# Patient Record
Sex: MALE | Race: WHITE
[De-identification: names, ages, dates, MRNs, and addresses within clinical notes are randomized per-mention and may not be internally consistent; named-entity substitution may affect disease eponyms.]

---

## 2018-12-04 ENCOUNTER — HOSPITAL ENCOUNTER (EMERGENCY)
Dept: HOSPITAL 97 - ER | Age: 83
Discharge: HOME | End: 2018-12-04
Payer: COMMERCIAL

## 2018-12-04 VITALS — TEMPERATURE: 98.9 F

## 2018-12-04 VITALS — DIASTOLIC BLOOD PRESSURE: 84 MMHG | OXYGEN SATURATION: 100 % | SYSTOLIC BLOOD PRESSURE: 139 MMHG

## 2018-12-04 DIAGNOSIS — Z23: ICD-10-CM

## 2018-12-04 DIAGNOSIS — Z79.899: ICD-10-CM
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
## 2018-12-04 DIAGNOSIS — Z79.82: ICD-10-CM

## 2018-12-04 DIAGNOSIS — S60.511A: ICD-10-CM

## 2018-12-04 DIAGNOSIS — I48.91: ICD-10-CM

## 2018-12-04 DIAGNOSIS — S60.512A: ICD-10-CM

## 2018-12-04 DIAGNOSIS — I10: ICD-10-CM

## 2018-12-04 DIAGNOSIS — S01.511A: Primary | ICD-10-CM

## 2018-12-04 DIAGNOSIS — S02.5XXA: ICD-10-CM

## 2018-12-04 PROCEDURE — 99284 EMERGENCY DEPT VISIT MOD MDM: CPT

## 2018-12-04 PROCEDURE — 0JQ10ZZ REPAIR FACE SUBCUTANEOUS TISSUE AND FASCIA, OPEN APPROACH: ICD-10-PCS

## 2018-12-04 PROCEDURE — 90714 TD VACC NO PRESV 7 YRS+ IM: CPT

## 2018-12-04 NOTE — ER
Nurse's Notes                                                                                     

 [facility]                                                                

Name: Tom Gamze Jr                                                                           

Age: 86 yrs                                                                                       

Sex: Male                                                                                         

: 1932                                                                                   

MRN: [id number]                                                                                   

Arrival Date: 2018                                                                          

Time: 13:56                                                                                       

Account#: [id number]                                                                            

Bed 8                                                                                             

Private MD: Denzel Quinones R                                                                       

Diagnosis: Fall due to bumping against object;Laceration without foreign body of other part of    

  head-lip , through and through;Fracture of tooth (traumatic)                                    

                                                                                                  

Presentation:                                                                                     

                                                                                             

14:01 Presenting complaint: Patient states: got his foot caught on a step and fell face       sv  

      forward, abrasions to face, bilateral hands, right knee pain, and knocked a front tooth     

      out. Care prior to arrival: None. Mechanism of Injury: Fall from standing position.         

      Trauma event details: Injury occurred in the [facility], Injury occurred: at        

      home. Injury occurred: 2018.                                                   

14:01 Acuity: HUMPHREY 3                                                                           sv  

14:01 Method Of Arrival: Ambulatory                                                           sv  

14:29 Transition of care: patient was not received from another setting of care. Onset of     tw2 

      symptoms was 2018. Risk Assessment: Do you want to hurt yourself or            

      someone else? Patient reports no desire to harm self or others. Initial Sepsis Screen:      

      Does the patient meet any 2 criteria? No. Patient's initial sepsis screen is negative.      

      Does the patient have a suspected source of infection? No. Patient's initial sepsis         

      screen is negative.                                                                         

                                                                                                  

Trauma Activation: Not Applicable                                                                 

 Physician: ED Physician; Name: ; Notified At: ; Arrived At:                                      

 Physician: General Surgeon; Name: ; Notified At: ; Arrived At:                                   

 Physician: Radiology; Name: ; Notified At: ; Arrived At:                                         

 Physician: Respiratory; Name: ; Notified At: ; Arrived At:                                       

 Physician: Lab; Name: ; Notified At: ; Arrived At:                                               

                                                                                                  

Historical:                                                                                       

- Allergies:                                                                                      

14:03 No Known Allergies;                                                                     sv  

- Home Meds:                                                                                      

14:32 aspirin 81 mg Oral TbEC 1 tab once daily [Active]; Centrum Silver Oral daily [Active];  tw2 

      lisinopril-hydrochlorothiazide 10-12.5 mg Oral tab 1 tab once daily [Active];               

- PMHx:                                                                                           

14:03 Atrial Fib; HARD OF HEARING; Hypertension;                                              sv  

- PSHx:                                                                                           

14:03 Appendectomy; Hernia repair;                                                            sv  

                                                                                                  

- Immunization history:: Adult Immunizations up to date.                                          

- Social history:: Smoking status: Patient/guardian denies using tobacco.                         

- Immunization history: Last tetanus immunization: unknown.                                       

- Ebola Screening: : No symptoms or risks identified at this time.                                

- Family history:: not pertinent.                                                                 

                                                                                                  

                                                                                                  

Screenin:08 Abuse screen: Denies threats or abuse. Nutritional screening: No deficits noted.        tw2 

      Tuberculosis screening: No symptoms or risk factors identified. Fall Risk None              

      identified.                                                                                 

                                                                                                  

Primary Survey:                                                                                   

14:08 A: Airway: patent. Breathing/Chest: Respiratory pattern: regular, Respiratory effort:   tw2 

      spontaneous, unlabored, Breath sounds: clear, bilaterally. Chest inspection:                

      symmetrical rise and fall of the chest. Circulation: Heart tones present. Disability        

      Alert.                                                                                      

15:15 Reassessment Airway Airway Patent Breathing/Chest Respiratory pattern Regular           tw2 

      Respiratory effort Spontaneous Unlabored Breath sounds Clear Chest inspection               

      Symmetrical Circulation Heart tones Present Disability Alert.                               

                                                                                                  

Secondary Survey:                                                                                 

14:15 HEENT: Face Other abrasions noted to nose, lip, and chin, with laceration to the upper  tw2 

      lip. Gastrointestinal: Abdomen is soft. : No signs and/or symptoms were reported          

      regarding the genitourinary system. Musculoskeletal: Range of motion: intact in all         

      extremities. pt has blood blister on the left ring finger, multiple abrasions noted to      

      right and left hand.                                                                        

                                                                                                  

Assessment:                                                                                       

14:15 General: Appears in no apparent distress. Behavior is calm, cooperative, appropriate    tw2 

      for age. Pain: Complains of pain in philtrum and upper vermilion border. Neuro: Level       

      of Consciousness is awake, alert, obeys commands, Oriented to person, place, time,          

      situation. Cardiovascular: Heart tones S1 S2 Patient's skin is warm and dry.                

      Respiratory: Airway is patent Respiratory effort is even, unlabored, Respiratory            

      pattern is regular, symmetrical, Breath sounds are clear bilaterally. GI: No signs          

      and/or symptoms were reported involving the gastrointestinal system. Abdomen is flat,       

      Bowel sounds present X 4 quads. : No signs and/or symptoms were reported regarding        

      the genitourinary system. EENT: No signs and/or symptoms were reported regarding the        

      EENT system. Derm: Skin is pink, warm \T\ dry. Skin temperature is warm. Musculoskeletal:   

      Range of motion: intact in all extremities. Injury Description: Laceration sustained to     

      philtrum is jagged, 0.5 to 2.5 cm long, not bleeding, front right tooth was knocked out     

      in the fall, tooth has been placed in milk at this time, pts spouse has removed dental      

      bridge at this time.                                                                        

15:24 Reassessment: Patient appears in no apparent distress at this time. No changes from     tw2 

      previously documented assessment. Patient and/or family updated on plan of care and         

      expected duration. Pain level reassessed. Patient is alert, oriented x 3, equal             

      unlabored respirations, skin warm/dry/pink.                                                 

16:25 Reassessment: Patient appears in no apparent distress at this time. No changes from     tw2 

      previously documented assessment. Patient and/or family updated on plan of care and         

      expected duration. Pain level reassessed. Patient is alert, oriented x 3, equal             

      unlabored respirations, skin warm/dry/pink. Dr. Teixeira at bedside suturing at this        

      time.                                                                                       

16:49 Reassessment: Patient appears in no apparent distress at this time. No changes from     tw2 

      previously documented assessment. Patient and/or family updated on plan of care and         

      expected duration. Pain level reassessed. Patient is alert, oriented x 3, equal             

      unlabored respirations, skin warm/dry/pink.                                                 

                                                                                                  

Vital Signs:                                                                                      

14:03  / 69; Pulse 89; Resp 18; Temp 98.9; Weight 96.16 kg; Height 6 ft. 3 in. (190.50  sv  

      cm); Pain 0/10;                                                                             

14:12 Pulse Ox 99% on R/A;                                                                    tw2 

15:24  / 85; Pulse 81; Resp 17; Pulse Ox 99% on R/A;                                    tw2 

16:28  / 84; Pulse 86; Resp 17; Pulse Ox 100% on R/A;                                   tw2 

14:03 Body Mass Index 26.50 (96.16 kg, 190.50 cm)                                             sv  

                                                                                                  

Waldron Coma Score:                                                                               

14:09 Eye Response: spontaneous(4). Verbal Response: oriented(5). Motor Response: obeys       tw2 

      commands(6). Total: 15.                                                                     

                                                                                                  

Trauma Score (Adult):                                                                             

14:09 Eye Response: spontaneous(1); Verbal Response: oriented(1); Motor Response: obeys       tw2 

      commands(2); Systolic BP: > 89 mm Hg(4); Respiratory Rate: 10 to 29 per min(4); Waldron     

      Score: 15; Trauma Score: 12                                                                 

                                                                                                  

ED Course:                                                                                        

11:15 Thermoregulation: warm blanket given to patient.                                        tw2 

13:56 Patient arrived in ED.                                                                  dl4 

13:57 Denzel Quinones MD is Private Physician.                                                  dl4 

14:03 Triage completed.                                                                       sv  

14:04 Arm band placed on.                                                                     sv  

14:05 Patient maintains SpO2 saturation greater than 95% on room air.                         tw2 

14:08 Martha Jones RN is Primary Nurse.                                                        tw2 

14:13 Phuc Teixeira MD is Attending Physician.                                             yareli 

14:15 Placed in gown. Bed in low position. Adult w/ patient. Pulse ox on. NIBP on. Warm       tw2 

      blanket given.                                                                              

16:01 Denzel Quinones MD is Referral Physician.                                                 yareli 

16:01 Wilder Mays DDS is Referral Physician.                                             yareli 

16:13 Awaiting: suturing at this time.                                                        tw2 

16:47 Assist provider with laceration repair on upper vermilion border and philtrum using     tw2 

      sutures. Set up tray. Performed by Phuc Teixeira MD Patient tolerated well. Patient        

      did not have IV access during this emergency room visit.                                    

                                                                                                  

Administered Medications:                                                                         

15:36 Drug: Tetanus-Diphtheria Toxoid Adult 0.5 ml {: [employer]. Exp:         tw2 

      2021. Lot #: [id number]. } Route: IM; Site: right deltoid;                                 

16:47 Follow up: Response: No adverse reaction                                                tw2 

15:36 Drug: Norco (7.5 mg-325 mg) 1 tabs Route: PO;                                           tw2 

16:46 Follow up: Response: No adverse reaction; Pain is decreased                             tw2 

15:37 Drug: KeFLEX 500 mg Route: PO;                                                          tw2 

16:46 Follow up: Response: No adverse reaction                                                tw2 

16:25 Drug: Lidocaine (1 %) 20 ml Volume: 20 ml; Route: Infiltration;                         tw2 

                                                                                                  

                                                                                                  

Output:                                                                                           

16:48 Urine: 200ml (Voided); Total: 200ml.                                                    tw2 

                                                                                                  

Outcome:                                                                                          

16:01 Discharge ordered by MD.                                                                yareli 

16:48 Discharged to home via wheelchair, with significant other.                              tw2 

16:48 Condition: stable                                                                           

16:48 Patient's length of stay in the Emergency Department was greater than 2 hours. Suture       

      timePatient's length of stay extended due to                                                

16:49 Discharge instructions given to patient, significant other, Instructed on discharge     tw2 

      instructions, follow up and referral plans. no drinking with medication, no driving         

      heavy equipment, medication usage, wound care, Demonstrated understanding of                

      instructions, follow-up care, medications, wound care, Prescriptions given X 3.             

16:49 Patient left the ED.                                                                    tw2 

                                                                                                  

Signatures:                                                                                       

Milagros Sanchez RN                    RN   Phuc Parkinson MD MD cha Wise, Tara, RN                          RN   tw2                                                  

Meño Mcelroy                                  dl4                                                  

                                                                                                  

Corrections: (The following items were deleted from the chart)                                    

14:04 14:03  / 69; Resp 18bpm; Temp 98.9F; 96.16 kg; Height 6 ft. 3 in.; BMI: 26.5;     sv  

      Pain 0/10; sv                                                                               

                                                                                                  

**************************************************************************************************

## 2018-12-04 NOTE — EDPHYS
Physician Documentation                                                                           

 [facility]                                                                

Name: Tom Gamez Jr                                                                           

Age: 86 yrs                                                                                       

Sex: Male                                                                                         

: 1932                                                                                   

MRN: [id number]                                                                                   

Arrival Date: 2018                                                                          

Time: 13:56                                                                                       

Account#: [id number]                                                                            

Bed 8                                                                                             

Private MD: Denzel Quinones R                                                                       

ED Physician Phuc Teixeira                                                                      

HPI:                                                                                              

                                                                                             

15:21 This 86 yrs old  Male presents to ER via Ambulatory with complaints of Fall    yareli 

      Injury.                                                                                     

15:21 Details of fall: The patient fell from an upright position, while walking. Onset: The   yareli 

      symptoms/episode began/occurred just prior to arrival. Associated injuries: The patient     

      sustained injury to the head, hematoma, laceration, pain, swelling, mouth and upper         

      vermilion border and philtrum. Severity of symptoms: At their worst the symptoms were       

      mild, in the emergency department the symptoms are unchanged. The patient has not           

      experienced similar symptoms in the past.                                                   

                                                                                                  

Historical:                                                                                       

- Allergies:                                                                                      

14:03 No Known Allergies;                                                                     sv  

- Home Meds:                                                                                      

14:32 aspirin 81 mg Oral TbEC 1 tab once daily [Active]; Centrum Silver Oral daily [Active];  tw2 

      lisinopril-hydrochlorothiazide 10-12.5 mg Oral tab 1 tab once daily [Active];               

- PMHx:                                                                                           

14:03 Atrial Fib; HARD OF HEARING; Hypertension;                                              sv  

- PSHx:                                                                                           

14:03 Appendectomy; Hernia repair;                                                            sv  

                                                                                                  

- Immunization history:: Adult Immunizations up to date.                                          

- Social history:: Smoking status: Patient/guardian denies using tobacco.                         

- Immunization history: Last tetanus immunization: unknown.                                       

- Ebola Screening: : No symptoms or risks identified at this time.                                

- Family history:: not pertinent.                                                                 

                                                                                                  

                                                                                                  

ROS:                                                                                              

15:21 Constitutional: Negative for fever, chills, and weight loss, Eyes: Negative for injury, yareli 

      pain, redness, and discharge, ENT: Negative for injury, pain, and discharge, Neck:          

      Negative for injury, pain, and swelling, Cardiovascular: Negative for chest pain,           

      palpitations, and edema, Respiratory: Negative for shortness of breath, cough,              

      wheezing, and pleuritic chest pain, Abdomen/GI: Negative for abdominal pain, nausea,        

      vomiting, diarrhea, and constipation, Back: Negative for injury and pain, : Negative      

      for injury, bleeding, discharge, and swelling, MS/Extremity: Negative for injury and        

      deformity, Neuro: Negative for headache, weakness, numbness, tingling, and seizure,         

      Psych: Negative for depression, anxiety, suicide ideation, homicidal ideation, and          

      hallucinations, Allergy/Immunology: Negative for hives, rash, and allergies, Endocrine:     

      Negative for neck swelling, polydipsia, polyuria, polyphagia, and marked weight             

      changes, Hematologic/Lymphatic: Negative for swollen nodes, abnormal bleeding, and          

      unusual bruising.                                                                           

15:21 Skin: Positive for laceration(s), swelling, of the mouth and upper vermilion border and     

      philtrum.                                                                                   

                                                                                                  

Exam:                                                                                             

15:21 Constitutional:  This is a well developed, well nourished patient who is awake, alert,  yareli 

      and in no acute distress. Eyes:  Pupils equal round and reactive to light, extra-ocular     

      motions intact.  Lids and lashes normal.  Conjunctiva and sclera are non-icteric and        

      not injected.  Cornea within normal limits.  Periorbital areas with no swelling,            

      redness, or edema. ENT:  Nares patent. No nasal discharge, no septal abnormalities          

      noted.  Tympanic membranes are normal and external auditory canals are clear.               

      Oropharynx with no redness, swelling, or masses, exudates, or evidence of obstruction,      

      uvula midline.  Mucous membranes moist. Neck:  Trachea midline, no thyromegaly or           

      masses palpated, and no cervical lymphadenopathy.  Supple, full range of motion without     

      nuchal rigidity, or vertebral point tenderness.  No Meningismus. Chest/axilla:  Normal      

      chest wall appearance and motion.  Nontender with no deformity.  No lesions are             

      appreciated. Cardiovascular:  Regular rate and rhythm with a normal S1 and S2.  No          

      gallops, murmurs, or rubs.  Normal PMI, no JVD.  No pulse deficits. Respiratory:  Lungs     

      have equal breath sounds bilaterally, clear to auscultation and percussion.  No rales,      

      rhonchi or wheezes noted.  No increased work of breathing, no retractions or nasal          

      flaring. Abdomen/GI:  Soft, non-tender, with normal bowel sounds.  No distension or         

      tympany.  No guarding or rebound.  No evidence of tenderness throughout. Back:  No          

      spinal tenderness.  No costovertebral tenderness.  Full range of motion. Skin:  Warm,       

      dry with normal turgor.  Normal color with no rashes, no lesions, and no evidence of        

      cellulitis. MS/ Extremity:  Pulses equal, no cyanosis.  Neurovascular intact.  Full,        

      normal range of motion. Neuro:  Awake and alert, GCS 15, oriented to person, place,         

      time, and situation.  Cranial nerves II-XII grossly intact.  Motor strength 5/5 in all      

      extremities.  Sensory grossly intact.  Cerebellar exam normal.  Normal gait. Psych:         

      Awake, alert, with orientation to person, place and time.  Behavior, mood, and affect       

      are within normal limits.                                                                   

15:21 Head/face: Noted is a laceration(s), that is jagged, 2.5 cm(s), swelling, that is           

      moderate, of the  nose and mouth.                                                           

15:21 Musculoskeletal/extremity: ROM: full active range of motion, full passive range of          

      motion, Circulation is intact in all extremities. Sensation intact. Compartment             

      Syndrome exam of affected extremity: is normal. no numbness, no tingling, no sensation      

      deficit, no palor, no weak pulses.                                                          

15:57 Neck: External neck: is normal, C-spine: appears grossly normal, no acute changes,      yareli 

      vertebral tenderness, is not appreciated, Thyroid: appears normal, Trachea: is midline      

      with no obvious abnormalities, ROM/movement: is normal, no acute changes, Lymph nodes:      

      no appreciated lymphadenopathy.                                                             

15:57 Musculoskeletal/extremity: skin tears on both hands, no bony tenderness, no suspicion       

      of fractures.                                                                               

                                                                                                  

Vital Signs:                                                                                      

14:03  / 69; Pulse 89; Resp 18; Temp 98.9; Weight 96.16 kg; Height 6 ft. 3 in. (190.50  sv  

      cm); Pain 0/10;                                                                             

14:12 Pulse Ox 99% on R/A;                                                                    tw2 

15:24  / 85; Pulse 81; Resp 17; Pulse Ox 99% on R/A;                                    tw2 

16:28  / 84; Pulse 86; Resp 17; Pulse Ox 100% on R/A;                                   tw2 

14:03 Body Mass Index 26.50 (96.16 kg, 190.50 cm)                                             sv  

                                                                                                  

Langston Coma Score:                                                                               

14:09 Eye Response: spontaneous(4). Verbal Response: oriented(5). Motor Response: obeys       tw2 

      commands(6). Total: 15.                                                                     

                                                                                                  

Trauma Score (Adult):                                                                             

14:09 Eye Response: spontaneous(1); Verbal Response: oriented(1); Motor Response: obeys       tw2 

      commands(2); Systolic BP: > 89 mm Hg(4); Respiratory Rate: 10 to 29 per min(4); Jamie     

      Score: 15; Trauma Score: 12                                                                 

                                                                                                  

Procedures:                                                                                       

15:21 I \T\ D: Incision and drainage was performed for an abscess of the.                       [facility]

                                                                                                  

Laceration:                                                                                       

15:21 Wound Repair of 2.5cm ( 1.0in ) subcutaneous laceration to mouth and upper vermilion    yareli 

      border and philtrum. Irregularly shaped.. Skin/tissue flap noted.. Distal                   

      neuro/vascular/tendon intact. Anesthesia: Local anesthetic administered with 5 mls of       

      1% lidocaine. Wound prep: Moderate cleansing by me. Skin closed with 3 5-0 chromic          

      using running sutures and sterile technique. Dressed with Neosporin. Patient tolerated      

      well.                                                                                       

                                                                                                  

[facility]:                                                                                              

14:13 Patient medically screened.                                                             [facility] 

15:21 Data reviewed: vital signs, nurses notes.                                               [facility] 

                                                                                                  

                                                                                             

14:52 Order name: Gloves, Sterile; Complete Time: 14:52                                       [facility] 

                                                                                             

14:52 Order name: Setup Suture Tray; Complete Time: 14:52                                     [facility] 

                                                                                             

15:21 Order name: Ice pack; Complete Time: 15:44                                              [facility] 

                                                                                             

15:23 Order name: Wound Care; Complete Time: 15:23                                            tw2 

                                                                                                  

Administered Medications:                                                                         

15:36 Drug: Tetanus-Diphtheria Toxoid Adult 0.5 ml {: PingSome. Exp:         2021. Lot #: [id number]. } Route: IM; Site: right deltoid;                                 

16:47 Follow up: Response: No adverse reaction                                                tw2 

15:36 Drug: Norco (7.5 mg-325 mg) 1 tabs Route: PO;                                           tw2 

16:46 Follow up: Response: No adverse reaction; Pain is decreased                             tw2 

15:37 Drug: KeFLEX 500 mg Route: PO;                                                          tw2 

16:46 Follow up: Response: No adverse reaction                                                tw2 

16:25 Drug: Lidocaine (1 %) 20 ml Volume: 20 ml; Route: Infiltration;                         tw2 

                                                                                                  

                                                                                                  

Disposition:                                                                                      

18 16:01 Discharged to Home. Impression: Fall due to bumping against object, Laceration     

  without foreign body of other part of head - lip , through and through,                         

  Fracture of tooth (traumatic).                                                                  

- Condition is Stable.                                                                            

- Discharge Instructions: Fall Prevention in the Home, Facial Laceration, Tooth                   

  Injuries, Tooth Avulsion, Facial Laceration, Easy-to-Read, Fall Prevention in the               

  Home, Easy-to-Read, Tooth Injuries, Easy-to-Read.                                               

- Prescriptions for Bactroban 2 % Topical Ointment - Apply to affected area 1                     

  application by TOPICAL route every 12 hours; 30 gram. Keflex 500 mg Oral Capsule -              

  take 1 capsule by ORAL route every 6 hours for 10 days; 40 capsule. Tylenol- Codeine            

  #3 300-30 mg Oral Tablet - take 2 tablets by ORAL route every 6 hours As needed; 24             

  tablet.                                                                                         

- Medication Reconciliation Form, Thank You Letter, Antibiotic Education, Prescription            

  Opioid Use form.                                                                                

- Follow up: Denzel Quinones; When: 2 - 3 days; Reason: Recheck today's complaints,                   

  Continuance of care, Re-evaluation by your physician. Follow up: Wilder Mays;                

  When: 2 - 3 days; Reason: Recheck today's complaints, Re-evaluation by your physician.          

- Problem is new.                                                                                 

- Symptoms have improved.                                                                         

                                                                                                  

                                                                                                  

                                                                                                  

Signatures:                                                                                       

Milagros Sanchez RN RN                                                      

Phuc Teixeira MD MD cha Wise, Tara, RN                          RN   tw2                                                  

                                                                                                  

Corrections: (The following items were deleted from the chart)                                    

16:49 16:01 2018 16:01 Discharged to Home. Impression: Fall due to bumping against      tw2 

      object; Laceration without foreign body of other part of head - lip , through and           

      through; Fracture of tooth (traumatic). Condition is Stable. Discharge Instructions:        

      Fall Prevention in the Home, Facial Laceration, Facial Laceration, Easy-to-Read, Fall       

      Prevention in the Home, Easy-to-Read, Tooth Injuries, Tooth Avulsion, Tooth Injuries,       

      Easy-to-Read. Prescriptions for Bactroban 2 % Topical Ointment - Apply to affected area     

      1 application by TOPICAL route every 12 hours; 30 gram, Keflex 500 mg Oral Capsule -        

      take 1 capsule by ORAL route every 6 hours for 10 days; 40 capsule, Tylenol-Codeine #3      

      300-30 mg Oral Tablet - take 2 tablets by ORAL route every 6 hours As needed; 24            

      tablet. and Forms are Medication Reconciliation Form, Thank You Letter, Antibiotic          

      Education, Prescription Opioid Use. Follow up: Denzel Quinones; When: 2 - 3 days; Reason:       

      Recheck today's complaints, Continuance of care, Re-evaluation by your physician.           

      Follow up: Wilder Mays; When: 2 - 3 days; Reason: Recheck today's complaints,            

      Re-evaluation by your physician. Problem is new. Symptoms have improved. yareli                

                                                                                                  

**************************************************************************************************